# Patient Record
Sex: FEMALE | Race: WHITE | NOT HISPANIC OR LATINO | Employment: UNEMPLOYED | URBAN - METROPOLITAN AREA
[De-identification: names, ages, dates, MRNs, and addresses within clinical notes are randomized per-mention and may not be internally consistent; named-entity substitution may affect disease eponyms.]

---

## 2017-01-19 ENCOUNTER — CLINICAL SUPPORT (OUTPATIENT)
Dept: SMOKING CESSATION | Facility: CLINIC | Age: 37
End: 2017-01-19
Payer: COMMERCIAL

## 2017-01-19 DIAGNOSIS — F17.200 NICOTINE DEPENDENCE: Primary | ICD-10-CM

## 2017-01-19 PROCEDURE — 99407 BEHAV CHNG SMOKING > 10 MIN: CPT | Mod: S$GLB,,, | Performed by: GENERAL PRACTICE

## 2017-01-19 RX ORDER — DIPHENHYDRAMINE HCL 25 MG
4 CAPSULE ORAL
Qty: 190 EACH | Refills: 0 | Status: SHIPPED | OUTPATIENT
Start: 2017-01-19 | End: 2017-01-24 | Stop reason: RX

## 2017-01-24 DIAGNOSIS — F17.200 NICOTINE DEPENDENCE: Primary | ICD-10-CM

## 2017-01-24 RX ORDER — DIPHENHYDRAMINE HCL 25 MG
4 CAPSULE ORAL
Qty: 110 EACH | Refills: 0 | Status: SHIPPED | OUTPATIENT
Start: 2017-01-24

## 2017-06-07 ENCOUNTER — TELEPHONE (OUTPATIENT)
Dept: SMOKING CESSATION | Facility: CLINIC | Age: 37
End: 2017-06-07

## 2017-09-27 ENCOUNTER — CLINICAL SUPPORT (OUTPATIENT)
Dept: SMOKING CESSATION | Facility: CLINIC | Age: 37
End: 2017-09-27
Payer: COMMERCIAL

## 2017-09-27 DIAGNOSIS — F17.200 NICOTINE DEPENDENCE: Primary | ICD-10-CM

## 2017-09-27 PROCEDURE — 99407 BEHAV CHNG SMOKING > 10 MIN: CPT | Mod: S$GLB,,,

## 2017-12-21 ENCOUNTER — CLINICAL SUPPORT (OUTPATIENT)
Dept: SMOKING CESSATION | Facility: CLINIC | Age: 37
End: 2017-12-21
Payer: COMMERCIAL

## 2017-12-21 DIAGNOSIS — F17.200 NICOTINE DEPENDENCE: Primary | ICD-10-CM

## 2017-12-21 PROCEDURE — 99407 BEHAV CHNG SMOKING > 10 MIN: CPT | Mod: S$GLB,,, | Performed by: INTERNAL MEDICINE

## 2018-01-08 ENCOUNTER — TELEPHONE (OUTPATIENT)
Dept: SMOKING CESSATION | Facility: CLINIC | Age: 38
End: 2018-01-08

## 2018-01-08 NOTE — TELEPHONE ENCOUNTER
Left message about missed intake for the tobacco cessation program and asked if the patient would like to reschedule. Left my name Yoan Vergara with phone number [phone].

## 2023-03-08 ENCOUNTER — HOSPITAL ENCOUNTER (EMERGENCY)
Facility: HOSPITAL | Age: 43
Discharge: LEFT BEFORE DISCHARGE | End: 2023-03-09
Attending: EMERGENCY MEDICINE

## 2023-03-08 DIAGNOSIS — R10.9 ABDOMINAL PAIN: ICD-10-CM

## 2023-03-08 PROCEDURE — 99285 EMERGENCY DEPT VISIT HI MDM: CPT | Mod: 25

## 2023-03-08 PROCEDURE — 96361 HYDRATE IV INFUSION ADD-ON: CPT

## 2023-03-08 PROCEDURE — 96375 TX/PRO/DX INJ NEW DRUG ADDON: CPT

## 2023-03-08 PROCEDURE — 96365 THER/PROPH/DIAG IV INF INIT: CPT

## 2023-03-09 ENCOUNTER — HOSPITAL ENCOUNTER (OUTPATIENT)
Facility: HOSPITAL | Age: 43
Discharge: HOME | End: 2023-03-10
Attending: EMERGENCY MEDICINE | Admitting: INTERNAL MEDICINE

## 2023-03-09 VITALS
RESPIRATION RATE: 16 BRPM | WEIGHT: 175 LBS | BODY MASS INDEX: 29.16 KG/M2 | HEART RATE: 80 BPM | SYSTOLIC BLOOD PRESSURE: 116 MMHG | DIASTOLIC BLOOD PRESSURE: 70 MMHG | OXYGEN SATURATION: 99 % | TEMPERATURE: 98 F | HEIGHT: 65 IN

## 2023-03-09 DIAGNOSIS — R07.9 CHEST PAIN: ICD-10-CM

## 2023-03-09 DIAGNOSIS — K80.81 BILIARY CALCULUS OF OTHER SITE WITH OBSTRUCTION: Primary | ICD-10-CM

## 2023-03-09 LAB
ALBUMIN SERPL BCP-MCNC: 3.9 G/DL (ref 3.5–5.2)
ALP SERPL-CCNC: 77 U/L (ref 55–135)
ALT SERPL W/O P-5'-P-CCNC: 25 U/L (ref 10–44)
ANION GAP SERPL CALC-SCNC: 9 MMOL/L (ref 8–16)
AST SERPL-CCNC: 22 U/L (ref 10–40)
BASOPHILS # BLD AUTO: 0.01 K/UL (ref 0–0.2)
BASOPHILS NFR BLD: 0.1 % (ref 0–1.9)
BILIRUB SERPL-MCNC: 0.4 MG/DL (ref 0.1–1)
BILIRUB UR QL STRIP: NEGATIVE
BUN SERPL-MCNC: 7 MG/DL (ref 6–20)
CALCIUM SERPL-MCNC: 8.8 MG/DL (ref 8.7–10.5)
CHLORIDE SERPL-SCNC: 99 MMOL/L (ref 95–110)
CLARITY UR: CLEAR
CO2 SERPL-SCNC: 27 MMOL/L (ref 23–29)
COLOR UR: YELLOW
CREAT SERPL-MCNC: 0.7 MG/DL (ref 0.5–1.4)
DIFFERENTIAL METHOD: ABNORMAL
EOSINOPHIL # BLD AUTO: 0.1 K/UL (ref 0–0.5)
EOSINOPHIL NFR BLD: 1 % (ref 0–8)
ERYTHROCYTE [DISTWIDTH] IN BLOOD BY AUTOMATED COUNT: 14.6 % (ref 11.5–14.5)
EST. GFR  (NO RACE VARIABLE): >60 ML/MIN/1.73 M^2
GLUCOSE SERPL-MCNC: 129 MG/DL (ref 70–110)
GLUCOSE UR QL STRIP: NEGATIVE
HCT VFR BLD AUTO: 38.9 % (ref 37–48.5)
HGB BLD-MCNC: 12.6 G/DL (ref 12–16)
HGB UR QL STRIP: NEGATIVE
IMM GRANULOCYTES # BLD AUTO: 0.03 K/UL (ref 0–0.04)
IMM GRANULOCYTES NFR BLD AUTO: 0.4 % (ref 0–0.5)
KETONES UR QL STRIP: NEGATIVE
LEUKOCYTE ESTERASE UR QL STRIP: NEGATIVE
LIPASE SERPL-CCNC: 34 U/L (ref 4–60)
LYMPHOCYTES # BLD AUTO: 2.2 K/UL (ref 1–4.8)
LYMPHOCYTES NFR BLD: 31 % (ref 18–48)
MCH RBC QN AUTO: 26.6 PG (ref 27–31)
MCHC RBC AUTO-ENTMCNC: 32.4 G/DL (ref 32–36)
MCV RBC AUTO: 82 FL (ref 82–98)
MONOCYTES # BLD AUTO: 0.5 K/UL (ref 0.3–1)
MONOCYTES NFR BLD: 6.3 % (ref 4–15)
NEUTROPHILS # BLD AUTO: 4.4 K/UL (ref 1.8–7.7)
NEUTROPHILS NFR BLD: 61.2 % (ref 38–73)
NITRITE UR QL STRIP: NEGATIVE
NRBC BLD-RTO: 0 /100 WBC
PH UR STRIP: 8 [PH] (ref 5–8)
PLATELET # BLD AUTO: 280 K/UL (ref 150–450)
PMV BLD AUTO: 9.9 FL (ref 9.2–12.9)
POTASSIUM SERPL-SCNC: 3.6 MMOL/L (ref 3.5–5.1)
PROT SERPL-MCNC: 7.5 G/DL (ref 6–8.4)
PROT UR QL STRIP: NEGATIVE
RBC # BLD AUTO: 4.73 M/UL (ref 4–5.4)
SODIUM SERPL-SCNC: 135 MMOL/L (ref 136–145)
SP GR UR STRIP: 1.01 (ref 1–1.03)
TROPONIN I SERPL HS-MCNC: 2.8 PG/ML (ref 0–14.9)
URN SPEC COLLECT METH UR: NORMAL
UROBILINOGEN UR STRIP-ACNC: NEGATIVE EU/DL
WBC # BLD AUTO: 7.17 K/UL (ref 3.9–12.7)

## 2023-03-09 PROCEDURE — 25000003 PHARM REV CODE 250: Performed by: INTERNAL MEDICINE

## 2023-03-09 PROCEDURE — 96375 TX/PRO/DX INJ NEW DRUG ADDON: CPT

## 2023-03-09 PROCEDURE — 80053 COMPREHEN METABOLIC PANEL: CPT | Performed by: NURSE PRACTITIONER

## 2023-03-09 PROCEDURE — 84484 ASSAY OF TROPONIN QUANT: CPT | Performed by: STUDENT IN AN ORGANIZED HEALTH CARE EDUCATION/TRAINING PROGRAM

## 2023-03-09 PROCEDURE — 99285 EMERGENCY DEPT VISIT HI MDM: CPT | Mod: 25

## 2023-03-09 PROCEDURE — 83690 ASSAY OF LIPASE: CPT | Performed by: NURSE PRACTITIONER

## 2023-03-09 PROCEDURE — 25000003 PHARM REV CODE 250: Performed by: EMERGENCY MEDICINE

## 2023-03-09 PROCEDURE — 63600175 PHARM REV CODE 636 W HCPCS: Performed by: STUDENT IN AN ORGANIZED HEALTH CARE EDUCATION/TRAINING PROGRAM

## 2023-03-09 PROCEDURE — 81003 URINALYSIS AUTO W/O SCOPE: CPT | Performed by: NURSE PRACTITIONER

## 2023-03-09 PROCEDURE — 93005 ELECTROCARDIOGRAM TRACING: CPT | Performed by: INTERNAL MEDICINE

## 2023-03-09 PROCEDURE — 96376 TX/PRO/DX INJ SAME DRUG ADON: CPT

## 2023-03-09 PROCEDURE — G0378 HOSPITAL OBSERVATION PER HR: HCPCS

## 2023-03-09 PROCEDURE — 25500020 PHARM REV CODE 255: Performed by: EMERGENCY MEDICINE

## 2023-03-09 PROCEDURE — 96372 THER/PROPH/DIAG INJ SC/IM: CPT | Mod: 59 | Performed by: INTERNAL MEDICINE

## 2023-03-09 PROCEDURE — 93010 ELECTROCARDIOGRAM REPORT: CPT | Mod: ,,, | Performed by: INTERNAL MEDICINE

## 2023-03-09 PROCEDURE — 96374 THER/PROPH/DIAG INJ IV PUSH: CPT

## 2023-03-09 PROCEDURE — 85025 COMPLETE CBC W/AUTO DIFF WBC: CPT | Performed by: NURSE PRACTITIONER

## 2023-03-09 PROCEDURE — 63600175 PHARM REV CODE 636 W HCPCS: Performed by: INTERNAL MEDICINE

## 2023-03-09 PROCEDURE — 63600175 PHARM REV CODE 636 W HCPCS: Performed by: EMERGENCY MEDICINE

## 2023-03-09 PROCEDURE — 96361 HYDRATE IV INFUSION ADD-ON: CPT

## 2023-03-09 PROCEDURE — 93010 EKG 12-LEAD: ICD-10-PCS | Mod: ,,, | Performed by: INTERNAL MEDICINE

## 2023-03-09 RX ORDER — LANOLIN ALCOHOL/MO/W.PET/CERES
800 CREAM (GRAM) TOPICAL
Status: DISCONTINUED | OUTPATIENT
Start: 2023-03-09 | End: 2023-03-10 | Stop reason: HOSPADM

## 2023-03-09 RX ORDER — HYDROMORPHONE HYDROCHLORIDE 1 MG/ML
1 INJECTION, SOLUTION INTRAMUSCULAR; INTRAVENOUS; SUBCUTANEOUS
Status: COMPLETED | OUTPATIENT
Start: 2023-03-09 | End: 2023-03-09

## 2023-03-09 RX ORDER — DIPHENHYDRAMINE HCL 25 MG
50 CAPSULE ORAL
Status: COMPLETED | OUTPATIENT
Start: 2023-03-09 | End: 2023-03-09

## 2023-03-09 RX ORDER — ONDANSETRON 2 MG/ML
4 INJECTION INTRAMUSCULAR; INTRAVENOUS
Status: COMPLETED | OUTPATIENT
Start: 2023-03-09 | End: 2023-03-09

## 2023-03-09 RX ORDER — HYDROMORPHONE HYDROCHLORIDE 1 MG/ML
1 INJECTION, SOLUTION INTRAMUSCULAR; INTRAVENOUS; SUBCUTANEOUS ONCE
Status: COMPLETED | OUTPATIENT
Start: 2023-03-09 | End: 2023-03-09

## 2023-03-09 RX ORDER — DICYCLOMINE HYDROCHLORIDE 20 MG/1
20 TABLET ORAL EVERY 6 HOURS
COMMUNITY
Start: 2023-03-02

## 2023-03-09 RX ORDER — GLUCAGON 1 MG
1 KIT INJECTION
Status: DISCONTINUED | OUTPATIENT
Start: 2023-03-09 | End: 2023-03-10 | Stop reason: HOSPADM

## 2023-03-09 RX ORDER — IBUPROFEN 200 MG
24 TABLET ORAL
Status: DISCONTINUED | OUTPATIENT
Start: 2023-03-09 | End: 2023-03-10 | Stop reason: HOSPADM

## 2023-03-09 RX ORDER — AMLODIPINE BESYLATE 5 MG/1
5 TABLET ORAL DAILY
COMMUNITY
Start: 2023-02-16

## 2023-03-09 RX ORDER — PREGABALIN 50 MG/1
50 CAPSULE ORAL 2 TIMES DAILY
COMMUNITY
Start: 2023-03-02

## 2023-03-09 RX ORDER — DROPERIDOL 2.5 MG/ML
1.25 INJECTION, SOLUTION INTRAMUSCULAR; INTRAVENOUS ONCE
Status: COMPLETED | OUTPATIENT
Start: 2023-03-09 | End: 2023-03-09

## 2023-03-09 RX ORDER — LORAZEPAM 2 MG/ML
1 INJECTION INTRAMUSCULAR ONCE
Status: COMPLETED | OUTPATIENT
Start: 2023-03-09 | End: 2023-03-09

## 2023-03-09 RX ORDER — QUETIAPINE FUMARATE 50 MG/1
50 TABLET, FILM COATED ORAL NIGHTLY
COMMUNITY
Start: 2023-02-16

## 2023-03-09 RX ORDER — IBUPROFEN 200 MG
16 TABLET ORAL
Status: DISCONTINUED | OUTPATIENT
Start: 2023-03-09 | End: 2023-03-10 | Stop reason: HOSPADM

## 2023-03-09 RX ORDER — DIPHENHYDRAMINE HCL 25 MG
50 CAPSULE ORAL EVERY 6 HOURS PRN
Status: DISCONTINUED | OUTPATIENT
Start: 2023-03-09 | End: 2023-03-10 | Stop reason: HOSPADM

## 2023-03-09 RX ORDER — SODIUM CHLORIDE 0.9 % (FLUSH) 0.9 %
10 SYRINGE (ML) INJECTION EVERY 12 HOURS PRN
Status: DISCONTINUED | OUTPATIENT
Start: 2023-03-09 | End: 2023-03-10 | Stop reason: HOSPADM

## 2023-03-09 RX ORDER — ENOXAPARIN SODIUM 100 MG/ML
40 INJECTION SUBCUTANEOUS EVERY 24 HOURS
Status: DISCONTINUED | OUTPATIENT
Start: 2023-03-09 | End: 2023-03-10 | Stop reason: HOSPADM

## 2023-03-09 RX ORDER — MORPHINE SULFATE 4 MG/ML
4 INJECTION, SOLUTION INTRAMUSCULAR; INTRAVENOUS
Status: COMPLETED | OUTPATIENT
Start: 2023-03-09 | End: 2023-03-09

## 2023-03-09 RX ORDER — OXYCODONE HYDROCHLORIDE 5 MG/1
5 TABLET ORAL EVERY 4 HOURS PRN
Status: DISCONTINUED | OUTPATIENT
Start: 2023-03-09 | End: 2023-03-10 | Stop reason: HOSPADM

## 2023-03-09 RX ORDER — CETIRIZINE HYDROCHLORIDE 10 MG/1
10 TABLET ORAL DAILY
COMMUNITY

## 2023-03-09 RX ORDER — NALOXONE HCL 0.4 MG/ML
0.02 VIAL (ML) INJECTION
Status: DISCONTINUED | OUTPATIENT
Start: 2023-03-09 | End: 2023-03-10 | Stop reason: HOSPADM

## 2023-03-09 RX ORDER — HYDROMORPHONE HYDROCHLORIDE 1 MG/ML
0.2 INJECTION, SOLUTION INTRAMUSCULAR; INTRAVENOUS; SUBCUTANEOUS EVERY 6 HOURS PRN
Status: DISCONTINUED | OUTPATIENT
Start: 2023-03-09 | End: 2023-03-10

## 2023-03-09 RX ORDER — ESTRADIOL 2 MG/1
2 TABLET ORAL EVERY MORNING
COMMUNITY
Start: 2023-02-16

## 2023-03-09 RX ORDER — LORAZEPAM 0.5 MG
1 TABLET ORAL DAILY PRN
COMMUNITY

## 2023-03-09 RX ORDER — SODIUM CHLORIDE 9 MG/ML
1000 INJECTION, SOLUTION INTRAVENOUS
Status: COMPLETED | OUTPATIENT
Start: 2023-03-09 | End: 2023-03-09

## 2023-03-09 RX ORDER — TIZANIDINE 4 MG/1
4 TABLET ORAL EVERY 6 HOURS PRN
COMMUNITY

## 2023-03-09 RX ORDER — HYOSCYAMINE SULFATE 0.12 MG/1
0.12 TABLET SUBLINGUAL EVERY 4 HOURS PRN
COMMUNITY
Start: 2022-11-11

## 2023-03-09 RX ORDER — CIPROFLOXACIN 500 MG/1
500 TABLET ORAL 2 TIMES DAILY
Qty: 20 TABLET | Refills: 0 | Status: SHIPPED | OUTPATIENT
Start: 2023-03-09 | End: 2023-03-19

## 2023-03-09 RX ORDER — DIPHENHYDRAMINE HYDROCHLORIDE 50 MG/ML
12.5 INJECTION INTRAMUSCULAR; INTRAVENOUS
Status: COMPLETED | OUTPATIENT
Start: 2023-03-09 | End: 2023-03-09

## 2023-03-09 RX ADMIN — LORAZEPAM 1 MG: 2 INJECTION INTRAMUSCULAR; INTRAVENOUS at 06:03

## 2023-03-09 RX ADMIN — IOHEXOL 100 ML: 350 INJECTION, SOLUTION INTRAVENOUS at 12:03

## 2023-03-09 RX ADMIN — DIPHENHYDRAMINE HYDROCHLORIDE 50 MG: 25 CAPSULE ORAL at 03:03

## 2023-03-09 RX ADMIN — DROPERIDOL 1.25 MG: 2.5 INJECTION, SOLUTION INTRAMUSCULAR; INTRAVENOUS at 02:03

## 2023-03-09 RX ADMIN — HYDROMORPHONE HYDROCHLORIDE 1 MG: 1 INJECTION, SOLUTION INTRAMUSCULAR; INTRAVENOUS; SUBCUTANEOUS at 12:03

## 2023-03-09 RX ADMIN — MORPHINE SULFATE 4 MG: 4 INJECTION, SOLUTION INTRAMUSCULAR; INTRAVENOUS at 12:03

## 2023-03-09 RX ADMIN — HYDROMORPHONE HYDROCHLORIDE 1 MG: 1 INJECTION, SOLUTION INTRAMUSCULAR; INTRAVENOUS; SUBCUTANEOUS at 01:03

## 2023-03-09 RX ADMIN — HYDROMORPHONE HYDROCHLORIDE 1 MG: 1 INJECTION, SOLUTION INTRAMUSCULAR; INTRAVENOUS; SUBCUTANEOUS at 08:03

## 2023-03-09 RX ADMIN — OXYCODONE HYDROCHLORIDE 5 MG: 5 TABLET ORAL at 06:03

## 2023-03-09 RX ADMIN — PIPERACILLIN AND TAZOBACTAM 4.5 G: 4; .5 INJECTION, POWDER, LYOPHILIZED, FOR SOLUTION INTRAVENOUS; PARENTERAL at 03:03

## 2023-03-09 RX ADMIN — DIPHENHYDRAMINE HYDROCHLORIDE 50 MG: 25 CAPSULE ORAL at 09:03

## 2023-03-09 RX ADMIN — ONDANSETRON 4 MG: 2 INJECTION INTRAMUSCULAR; INTRAVENOUS at 12:03

## 2023-03-09 RX ADMIN — SODIUM CHLORIDE 1000 ML: 0.9 INJECTION, SOLUTION INTRAVENOUS at 12:03

## 2023-03-09 RX ADMIN — ENOXAPARIN SODIUM 40 MG: 100 INJECTION SUBCUTANEOUS at 05:03

## 2023-03-09 RX ADMIN — HYDROMORPHONE HYDROCHLORIDE 0.2 MG: 0.5 INJECTION, SOLUTION INTRAMUSCULAR; INTRAVENOUS; SUBCUTANEOUS at 04:03

## 2023-03-09 RX ADMIN — DIPHENHYDRAMINE HYDROCHLORIDE 12.5 MG: 50 INJECTION INTRAMUSCULAR; INTRAVENOUS at 02:03

## 2023-03-09 RX ADMIN — SODIUM CHLORIDE, SODIUM LACTATE, POTASSIUM CHLORIDE, AND CALCIUM CHLORIDE 1000 ML: .6; .31; .03; .02 INJECTION, SOLUTION INTRAVENOUS at 02:03

## 2023-03-09 NOTE — ED NOTES
Sent a message to Dr. Antonio about pt having itching and requesting mediation. Awaiting response.

## 2023-03-09 NOTE — ED PROVIDER NOTES
Encounter Date: 3/9/2023       History     Chief Complaint   Patient presents with    Abdominal Pain     SEEN HERE THIS AM FOR THE SAME.      Patient presents complaining of abdominal pain and discomfort.  Patient was recently evaluated earlier today and diagnosed with dilated common bile duct.  Patient left against medical advice.  She returns complaining of pain.    Review of patient's allergies indicates:   Allergen Reactions    Compazine [prochlorperazine edisylate] Other (See Comments)     Insomnia, jittery    Toradol [ketorolac] Other (See Comments)     Insomnia, jittery       Past Medical History:   Diagnosis Date    General anesthetics causing adverse effect in therapeutic use     slow to wake up / post op shivering    Migraine     Panic attacks     PONV (postoperative nausea and vomiting)      Past Surgical History:   Procedure Laterality Date    DILATION AND CURETTAGE OF UTERUS      x 2    ECTOPIC PREGNANCY SURGERY  2012    x 2    PELVIC LAPAROSCOPY      TUBAL LIGATION       Family History   Problem Relation Age of Onset    Hypertension Unknown     Colon cancer Neg Hx     Stomach cancer Neg Hx     Esophageal cancer Neg Hx     Breast cancer Neg Hx     Ovarian cancer Neg Hx      Social History     Tobacco Use    Smoking status: Every Day     Packs/day: 1.00     Years: 30.00     Pack years: 30.00     Types: Cigarettes     Start date: 4/9/1980    Smokeless tobacco: Never    Tobacco comments:     instructed not to smoke after midnight prior to surgery   Substance Use Topics    Alcohol use: Yes     Comment: occassionally  No alcohol prior to surgery    Drug use: No     Review of Systems   All other systems reviewed and are negative.    Physical Exam     Initial Vitals [03/09/23 1159]   BP Pulse Resp Temp SpO2   139/83 89 20 98 °F (36.7 °C) 98 %      MAP       --         Physical Exam    Nursing note and vitals reviewed.  Constitutional: She appears distressed.   Pleasant, polite   HENT:   Head: Normocephalic and  atraumatic.   Eyes: EOM are normal.   Neck: Neck supple.   Normal range of motion.  Cardiovascular:  Intact distal pulses.           Pulmonary/Chest: No respiratory distress.   Abdominal: She exhibits no distension. There is abdominal tenderness. There is no rebound.   Musculoskeletal:      Cervical back: Normal range of motion and neck supple.     Neurological: She is alert and oriented to person, place, and time.   Skin: Skin is warm and dry. Capillary refill takes less than 2 seconds.   Psychiatric: She has a normal mood and affect. Her behavior is normal. Judgment and thought content normal.       ED Course   Procedures  Labs Reviewed - No data to display       Imaging Results    None          Medications   sodium chloride 0.9% flush 10 mL (has no administration in time range)   naloxone 0.4 mg/mL injection 0.02 mg (has no administration in time range)   glucose chewable tablet 16 g (has no administration in time range)   glucose chewable tablet 24 g (has no administration in time range)   glucagon (human recombinant) injection 1 mg (has no administration in time range)   dextrose 10% bolus 125 mL 125 mL (has no administration in time range)   dextrose 10% bolus 250 mL 250 mL (has no administration in time range)   enoxaparin injection 40 mg (has no administration in time range)   potassium bicarbonate disintegrating tablet 50 mEq (has no administration in time range)   potassium bicarbonate disintegrating tablet 60 mEq (has no administration in time range)   potassium bicarbonate disintegrating tablet 35 mEq (has no administration in time range)   magnesium oxide tablet 800 mg (has no administration in time range)   magnesium oxide tablet 800 mg (has no administration in time range)   HYDROmorphone injection 0.2 mg (has no administration in time range)   LORazepam injection 1 mg (has no administration in time range)   HYDROmorphone injection 1 mg (1 mg Intravenous Given During Downtime 3/9/23 1215)   0.9%  NaCl  infusion (0 mLs Intravenous Stopped 3/9/23 1255)   ondansetron injection 4 mg (4 mg Intravenous Given During Downtime 3/9/23 1215)   HYDROmorphone injection 1 mg (1 mg Intravenous Given 3/9/23 1335)   diphenhydrAMINE injection 12.5 mg (12.5 mg Intravenous Given 3/9/23 1419)   diphenhydrAMINE capsule 50 mg (50 mg Oral Given 3/9/23 1541)     Medical Decision Making:   Initial Assessment:   Patient appears in pain  Differential Diagnosis:   Patient has a dilated common bile duct of uncertain etiology  ED Management:  MDM    Patient presents for emergent evaluation of acute abdominal pain that poses a possible threat to life and/or bodily function.    In the ED patient found to have acute dilated common bile duct.   I personally reviewed labs from previous.  Labs significant for normal liver function.      I reviewed it and reviewed the radiologist interpretation.  CT significant for dilated common bile duct.      Admission MDM  I discussed the patient presentation labs, ekg, X-rays, CT findings with the consultant(s) hospitalist   Patient was managed in the ED with IV Dilaudid.    The response to treatment was improved.    Patient required emergent consultation to hospitalist for admission.  Patient will need further evaluation for dilated common bile duct included MRCP and GI evaluation.  I discussed this with the hospitalist.                        Clinical Impression:   Final diagnoses:  [K80.81] Biliary calculus of other site with obstruction (Primary)        ED Disposition Condition    Observation                 Kylre Jones MD  03/09/23 5830

## 2023-03-09 NOTE — ED PROVIDER NOTES
Encounter Date: 3/8/2023       History     Chief Complaint   Patient presents with    Abdominal Pain     Across upper abdomen to back since about 7 PM     HPI    Latrice Cruz is a 42 y.o. female with a past medical history of IBD, fibromyalgia, migraines, and recurrent abdominal pain that presents emergency department for evaluation of severe sudden-onset upper abdominal pain that started approximately 7:00 p.m. today.  Patient was in her usual state of health when she began to have severe abdominal pain.  This radiates across her entire upper abdomen.  Endorses constipation.  Has not tried to eat since onset of pain.  She is experienced this pain in the past and has had multiple large workups without finding any etiology.  Denies shortness of breath, fever, chest pain, urinary symptoms, and URI symptoms.    Review of patient's allergies indicates:   Allergen Reactions    Compazine [prochlorperazine edisylate] Other (See Comments)     Insomnia, jittery    Toradol [ketorolac] Other (See Comments)     Insomnia, jittery       Past Medical History:   Diagnosis Date    General anesthetics causing adverse effect in therapeutic use     slow to wake up / post op shivering    Migraine     Panic attacks     PONV (postoperative nausea and vomiting)      Past Surgical History:   Procedure Laterality Date    DILATION AND CURETTAGE OF UTERUS      x 2    ECTOPIC PREGNANCY SURGERY  2012    x 2    PELVIC LAPAROSCOPY      TUBAL LIGATION       Family History   Problem Relation Age of Onset    Hypertension Unknown     Colon cancer Neg Hx     Stomach cancer Neg Hx     Esophageal cancer Neg Hx     Breast cancer Neg Hx     Ovarian cancer Neg Hx      Social History     Tobacco Use    Smoking status: Every Day     Packs/day: 1.00     Years: 30.00     Pack years: 30.00     Types: Cigarettes     Start date: 4/9/1980    Smokeless tobacco: Never    Tobacco comments:     instructed not to smoke after midnight prior to surgery   Substance Use  Topics    Alcohol use: Yes     Comment: occassionally  No alcohol prior to surgery    Drug use: No     Review of Systems   Constitutional:  Negative for fever.   HENT:  Negative for sore throat.    Respiratory:  Negative for shortness of breath.    Cardiovascular:  Negative for chest pain.   Gastrointestinal:  Positive for abdominal pain, constipation, nausea and vomiting. Negative for diarrhea.   Genitourinary:  Negative for dysuria.   Musculoskeletal:  Negative for back pain.   Skin:  Negative for rash.   Neurological:  Negative for weakness.   Hematological:  Does not bruise/bleed easily.     Physical Exam     Initial Vitals [03/08/23 2344]   BP Pulse Resp Temp SpO2   129/73 81 (!) 26 97.6 °F (36.4 °C) 98 %      MAP       --         Physical Exam    Nursing note and vitals reviewed.  Constitutional: She appears well-developed and well-nourished.   HENT:   Head: Normocephalic and atraumatic.   Eyes: EOM are normal. Pupils are equal, round, and reactive to light.   Neck: Neck supple.   Normal range of motion.  Cardiovascular:  Normal rate and regular rhythm.           Pulmonary/Chest: Breath sounds normal. No respiratory distress. She has no wheezes. She has no rales. She exhibits no tenderness.   Abdominal: There is abdominal tenderness (Severe diffuse). There is guarding (Voluntary).   Musculoskeletal:         General: Normal range of motion.      Cervical back: Normal range of motion and neck supple.     Neurological: She is alert and oriented to person, place, and time.   Skin: Capillary refill takes less than 2 seconds.   Psychiatric: She has a normal mood and affect.       ED Course   Procedures  Labs Reviewed   CBC W/ AUTO DIFFERENTIAL - Abnormal; Notable for the following components:       Result Value    MCH 26.6 (*)     RDW 14.6 (*)     All other components within normal limits    Narrative:     For upper or mid abdominal pain.   COMPREHENSIVE METABOLIC PANEL - Abnormal; Notable for the following  components:    Sodium 135 (*)     Glucose 129 (*)     All other components within normal limits    Narrative:     For upper or mid abdominal pain.   URINALYSIS, REFLEX TO URINE CULTURE    Narrative:     In and Out Cath as needed it patient unable to void  Specimen Source->Urine   LIPASE    Narrative:     For upper or mid abdominal pain.   TROPONIN I HIGH SENSITIVITY        ECG Results              EKG 12-lead (In process)  Result time 03/09/23 05:12:37      In process by Interface, Lab In [facility] (03/09/23 05:12:37)                   Narrative:    Test Reason : R10.9,    Vent. Rate : 073 BPM     Atrial Rate : 073 BPM     P-R Int : 156 ms          QRS Dur : 078 ms      QT Int : 428 ms       P-R-T Axes : 030 047 056 degrees     QTc Int : 471 ms    Normal sinus rhythm  Normal ECG  When compared with ECG of 25-SEP-2015 10:07,  No significant change was found    Referred By: [email or web address]   SELF           Confirmed By:                                   Imaging Results              US Abdomen Limited (Final result)  Result time 03/09/23 02:53:28      Final result by Landon Holguin MD (03/09/23 02:53:28)                   Narrative:    EXAM DESCRIPTION: US ABDOMEN LIMITED    CLINICAL HISTORY: 42 years Female, r/o js, biliary duct dilitation    COMPARISON: None.    TECHNIQUE: Sonographic imaging of the right upper quadrant was performed.    FINDINGS:    Liver measures 15.7 cm longitudinally and demonstrates increased echogenicity suggesting fatty changes. Normal directional flow in the main portal vein is demonstrated.    A few echogenic shadowing stones in the gallbladder are demonstrated measuring up to 1.2 cm. No evidence of gallbladder wall thickening. Gallbladder appears normal in size. Common bile duct appears large measuring up to 8mm in width. There is a small echogenic focus in the common bile duct questionable for a nonshadowing stone.    Pancreas is not well-seen due to bowel gas.    Right kidney measures 10.1 cm  x 4.8 cm x 4.8 cm. No evidence of right-sided hydronephrosis or renal stones. Echogenicity appears normal.    IMPRESSION:    1. Cholelithiasis. No findings to suggest cholecystitis.  2. Common bile duct appears dilated measuring up to 8 mm in width. Small echogenic structure in the common bile duct which may represent a small gallstone/choledocholithiasis versus artifact. Follow-up MRCP can be obtained for further evaluation.  3. Hepatic steatosis.    Electronically signed by:  Landon Holguin MD  3/9/2023 2:53 AM CST Workstation: [id number]                                     CT Abdomen Pelvis With Contrast (Final result)  Result time 03/09/23 01:33:51      Final result by Landon Holguin MD (03/09/23 01:33:51)                   Narrative:    CT ABDOMEN PELVIS WITH IV CONTRAST    Exam date: March 9, 2023    Comparison: None    Indication: Abdominal pain    Technique:    Multiple helical axial images were obtained through the abdomen and pelvis using intravenous contrast. Coronal and sagittal reformatted images were obtained.    All CT scans at this facility use dose modulation, iterative reconstruction, and/or weight-based dosing when appropriate to reduce radiation dose to as low as reasonably achievable.    Findings:    Lung bases: [Appear unremarkable].    Liver: [There is low-attenuation suggesting fatty changes.]    Gallbladder/biliary: [Gallbladder appears unremarkable. Common bile duct is dilated measuring up to 1 cm in width.    Pancreas: [Unremarkable.  No evidence of ductal enlargement.]    Spleen: Appears unremarkable. No splenomegaly.    Adrenals: Unremarkable.    Kidneys and ureters: [No evidence of hydronephrosis.  Normal enhancement.] Possible 2 mm stone in the midpole of the left kidney (series 3, image 96).    Bladder: Unremarkable.    Pelvic organs: There is a 2 cm hypodense, cystic structure at the left vaginal introitus suggestive of Bartholin's gland cyst. Post hysterectomy changes  demonstrated.    Bowel: [No evidence of bowel obstruction. There is possible pneumatosis involving the cecum. Rectum appears mildly thickened with trace adjacent stranding.] Appendix appears unremarkable.    Vasculature: Minimal aortoiliac atherosclerosis noted.    Peritoneum: No free air. No significant free fluid.    Lymph nodes: Unremarkable.    Soft tissues: Unremarkable.    Bones: Unremarkable.    Impression:    1. Nonspecific dilatation of the common bile duct, underlying distal biliary obstructive process is not excluded. Follow-up ultrasound may be helpful for further evaluation as appropriate.  2. Mildly thickened appearance of the rectum which may be related to contraction or proctitis. Possible nonspecific pneumatosis involving the cecum.  3. Hepatic steatosis.  4. Left-sided Bartholin's gland cyst.    Electronically signed by:  Landon Holguin MD  3/9/2023 1:33 AM CST Workstation: [id number]                                     X-Ray Chest AP Portable (Final result)  Result time 03/09/23 02:52:01      Final result by Nawaf Coffman MD (03/09/23 02:52:01)                   Narrative:    XR CHEST 1 VIEW    COMPARISONS: None.    ADDITIONAL PERTINENT HISTORY: Upper abdominal pain    FINDINGS:  Cardiomediastinal silhouette:  Negative.    Pulmonary vasculature:  Negative.    Lung fields:  Negative.    Pleural spaces: Negative.    Osseous structures:  Negative.    Surrounding soft tissues:  Negative.      IMPRESSION: No acute cardiopulmonary disease.    Electronically signed by:  Nawaf Coffman MD  3/9/2023 2:52 AM CST Workstation: [id number]                                     Medications   morphine injection 4 mg (4 mg Intravenous Given 3/9/23 0027)   ondansetron injection 4 mg (4 mg Intravenous Given 3/9/23 0027)   iohexoL (OMNIPAQUE 350) injection 100 mL (100 mLs Intravenous Given 3/9/23 0059)   droperidoL injection 1.25 mg (1.25 mg Intravenous Given 3/9/23 0201)   lactated ringers bolus 1,000 mL (0 mLs  Intravenous Stopped 3/9/23 6399)   piperacillin-tazobactam 4.5 g in dextrose 5 % 100 mL IVPB (ready to mix system) (0 g Intravenous Stopped 3/9/23 0056)     Medical Decision Making:   Initial Assessment:   Latrice Cruz is a 42 y.o. female with a past medical history of IBD, fibromyalgia, migraines, and recurrent abdominal pain that presents emergency department for evaluation of severe sudden-onset upper abdominal pain that started approximately 7:00 p.m. today.  Vitals are stable.  Exam notable for uncomfortable appearing female.  Lungs clear to auscultation bilaterally.  Heart sounds within normal limits.  Abdominal exam with diffuse tenderness with voluntary guarding throughout.  No rashes.  No lower extremity edema.  Differential Diagnosis:   Pancreatitis, gastritis, cholecystitis, choledocholithiasis, appendicitis, colitis, obstruction, and perforation.  Clinical Tests:   Lab Tests: Ordered and Reviewed  The following lab test(s) were unremarkable: CBC, CMP, Lipase and Troponin       <> Summary of Lab: Lipase was negative.  Troponin was negative.  Electrolytes were grossly normal.  CBC with no leukocytosis and normal hemoglobin.  Patient has hysterectomy, so not pregnant.  Radiological Study: Ordered and Reviewed  Medical Tests: Ordered and Reviewed  ED Management:  CT of the abdomen most concerning for possible common bile duct dilatation with possible proctitis.  There is also the possibility of pneumatosis in the cecum.  Given fluids and Zosyn.  Patient was given 1 L of fluids, Zofran, morphine, and droperidol near resolution of pain.  Given that she had the common bile duct dilatation, obtained follow-up ultrasound which did not definitively show choledocholithiasis.  They recommended getting an MRCP.  Discussed this with patient did not want to get admitted for an MRCP.  Discussed the risks of leaving including that her possible choledocholithiasis can evolve into cholangitis or cholecystitis which can  worsen into sepsis in the ultimately death.  Patient understood this and stated that she wished to leave so that she can care for her mother.  Return precautions given.  Prescribed ciprofloxacin for possible intra-abdominal infection.  Discharging home against medical advice.    Manjit Rodriguez  [facility] PGY3  03/09/2023 5:42 AM        Attending Note:  I provided a face to face evaluation of this patient.  I discussed the patient's care with the Resident.  I reviewed their note and agree with the history, physical, assessment, diagnosis, treatment, all procedures performed, xray and EKG interpretations and admit plan provided by the Resident. My overall impression is cholelithiasis with choledocholithiasis and common bile duct dilation, hepatic steatosis, severe right upper quadrant abdominal pain Bartholin gland cyst on left, possible proctitis, severe upper abdominal pain.  Patient came in with severe abdominal pain with peritoneal signs including severe tenderness throughout the abdomen voluntary guarding and rebound to right upper quadrant.  Pain was only controlled after both morphine and droperidol were provided.  Patient also received 4 mg of Zofran for nausea.  She received 1 L lactated Ringer's, and 4.5 g of Zosyn when she was found to have the choledocholithiasis in preparation to admit the patient to Hospital Medicine for MRCP this morning and GI consult but patient decided to leave AMA she understands her risks including choledocholithiasis resulting in cholangitis and cholecystitis possibly developing pancreatitis hepatitis sepsis intra-abdominal abscess or intra-abdominal infection.  We will discharge home with Cipro understanding that this is not adequate treatment and patient should ultimately be admitted and evaluated by GI.    Date: 3/9/2023  Patient: Latrice HARRIS Nancy  Admitted: 3/8/2023 11:43 PM  Attending Provider: MD Latrice Xiao or her authorized caregiver has made  "the decision for the patient to leave the emergency department against the advice of the emergency department staff. She or her authorized caregiver has been informed and understands the inherent risks, including death.  She or her authorized caregiver has decided to accept the responsibility for this decision. Latrice Cruz and all necessary parties have been advised that she may return for further evaluation or treatment. Her condition at time of discharge was Stable.  Latrice Cruz had current vital signs as follows:  /67   Pulse 72   Temp 97.6 °F (36.4 °C) (Oral)   Resp 16   Ht 5' 5" (1.651 m)   Wt 79.4 kg (175 lb)   LMP 09/02/2015 (Exact Date)     Rand Pope M.D. 3/9/2023 5:00 AM                 ED Course as of 03/09/23 0524   u Mar 09, 2023   0150 US Abdomen Limited [DC]      ED Course User Index  [DC] Manjit Rodriguez MD                 Clinical Impression:   Final diagnoses:  [R10.9] Abdominal pain        ED Disposition Condition    AMA Stable                Manjit Rodriguez MD  Resident  03/09/23 0542    "

## 2023-03-09 NOTE — H&P
[facility] Medicine History & Physical Examination   Patient Name: Latrice Cruz  MRN: [id number]  Patient Class: OP- Observation   Admission Date: 3/9/2023 11:47 AM  Length of Stay: 0  Attending Physician: Juarez Martin MD  Primary Care Provider: Digna Cruz MD  Face-to-Face encounter date: 03/09/2023  Code Status: Full Code  MPOA:  Chief Complaint: Abdominal Pain (SEEN HERE THIS AM FOR THE SAME. )        HISTORY OF PRESENT ILLNESS:   This is a 42-year-old female patient with past medical history of fibromyalgia, migraines, IBD, recurrent abdominal pain who presented to the emergency room last night with severe onset epigastric pain that started last night.  Patient says she is had similar pains multiple times in the past and has had multiple workup, imaging studies which have shown nothing.  She says the pain usually comes on suddenly is not associated with eating or any other activities.  She denies any episodes of nausea, vomiting, diarrhea, headache, fever, chest pain shortness a breath.  In the emergency room she had right upper quadrant ultrasound, CT scan which showed possible CBD dilation and cholelithiasis.  Her labs were unremarkable.  She is had no fevers.    REVIEW OF SYSTEMS:   10 Point Review of System was performed and was found to be negative except for that mentioned already in the HPI above.     PAST MEDICAL HISTORY:     Past Medical History:   Diagnosis Date    General anesthetics causing adverse effect in therapeutic use     slow to wake up / post op shivering    Migraine     Panic attacks     PONV (postoperative nausea and vomiting)        PAST SURGICAL HISTORY:     Past Surgical History:   Procedure Laterality Date    DILATION AND CURETTAGE OF UTERUS      x 2    ECTOPIC PREGNANCY SURGERY  2012    x 2    PELVIC LAPAROSCOPY      TUBAL LIGATION         ALLERGIES:   Compazine [prochlorperazine edisylate] and Toradol [ketorolac]    FAMILY HISTORY:     Family History    Problem Relation Age of Onset    Hypertension Unknown     Colon cancer Neg Hx     Stomach cancer Neg Hx     Esophageal cancer Neg Hx     Breast cancer Neg Hx     Ovarian cancer Neg Hx        SOCIAL HISTORY:     Social History     Tobacco Use    Smoking status: Every Day     Packs/day: 1.00     Years: 30.00     Pack years: 30.00     Types: Cigarettes     Start date: 4/9/1980    Smokeless tobacco: Never    Tobacco comments:     instructed not to smoke after midnight prior to surgery   Substance Use Topics    Alcohol use: Yes     Comment: occassionally  No alcohol prior to surgery        Social History     Substance and Sexual Activity   Sexual Activity Not on file        HOME MEDICATIONS:     Prior to Admission medications    Medication Sig Start Date End Date Taking? Authorizing Provider   alprazolam (XANAX) 0.25 MG tablet Take 1 tablet (0.25 mg total) by mouth 3 (three) times daily as needed for Anxiety (panic attack not controlled with xanax XR). 8/20/15   KINZA Caballero II, MD   amLODIPine (NORVASC) 5 MG tablet Take 5 mg by mouth once daily. 2/16/23   Historical Provider   aspirin-acetaminophen-caffeine 250-250-65 mg (EXCEDRIN MIGRAINE) 250-250-65 mg per tablet Take 1 tablet by mouth every 6 (six) hours as needed for Pain.    Historical Provider   butalbit/acetamin/caff/codeine (BUTALBITAL-ACETAMINOP-CAF-COD ORAL) butalbital-acetaminop-caf-cod Take No date recorded No form recorded No frequency recorded No route recorded No set duration recorded No set duration amount recorded active No dosage strength recorded No dosage strength units of measure recorded    Historical Provider   ciprofloxacin HCl (CIPRO) 500 MG tablet Take 1 tablet (500 mg total) by mouth 2 (two) times daily. for 10 days 3/9/23 3/19/23  Manjit Rodriguez MD   DESVENLAFAXINE SUCCINATE ORAL desvenlafaxine succinate Take No date recorded No form recorded No frequency recorded No route recorded No set duration recorded No set duration  "amount recorded active No dosage strength recorded No dosage strength units of measure recorded    Historical Provider   dicyclomine (BENTYL) 20 mg tablet Take 20 mg by mouth every 6 (six) hours. 3/2/23   Historical Provider   estradioL (ESTRACE) 2 MG tablet Take 2 mg by mouth every morning. 2/16/23   Historical Provider   hydrocodone-acetaminophen 5-325mg (NORCO) 5-325 mg per tablet Take 1 tablet by mouth every 6 (six) hours as needed for Pain. 9/28/15   Emigdio Bean MD   hyoscyamine (LEVSIN/SL) 0.125 mg Subl Take 0.125 mg by mouth every 4 (four) hours as needed. 11/11/22   Historical Provider   meperidine (DEMEROL) 50 mg tablet Take 0.5 tablets (25 mg total) by mouth every 4 (four) hours as needed for Pain. 10/5/15   Heather Luciano MD   naproxen sodium (ANAPROX) 220 MG tablet Take 220 mg by mouth every 12 (twelve) hours.    Historical Provider   naratriptan (AMERGE) 2.5 MG tablet Take 1 tab at onset of migraine.  May repeat 1 tab in 2 hours.  Limit to 3 tabs/week.  Patient taking differently: Take 2.5 mg by mouth as needed. Take 1 tab at onset of migraine.  May repeat 1 tab in 2 hours.  Limit to 3 tabs/week. 5/1/15 5/31/15  Wilberto Vasquez MD   nicotine (NICODERM CQ) 14 mg/24 hr Place 1 patch onto the skin once daily. 12/9/16   Cynthia Brannon MD   nicotine polacrilex (NICORETTE) 4 MG Gum Take 1 each (4 mg total) by mouth as needed (use no more than 4 pieces in 24 hours). 1/24/17   Cynthia Brannon MD   pregabalin (LYRICA) 50 MG capsule Take 50 mg by mouth 2 (two) times daily. 3/2/23   Historical Provider   QUEtiapine (SEROQUEL) 50 MG tablet Take 50 mg by mouth every evening. 2/16/23   Historical Provider         PHYSICAL EXAM:   /82   Pulse 82   Temp 98 °F (36.7 °C) (Oral)   Resp 17   Ht 5' 5" (1.651 m)   Wt 79.4 kg (175 lb)   LMP 09/02/2015 (Exact Date)   SpO2 97%   BMI 29.12 kg/m²   Vitals Reviewed  Constitutional: She appears well-developed and well-nourished.  NAD.  HENT:   Head: " Normocephalic and atraumatic.   Eyes: EOM are normal. Pupils are equal, round, and reactive to light.   Neck: Neck supple.   Normal range of motion.  Cardiovascular:  Normal rate and regular rhythm.           Pulmonary/Chest: Breath sounds normal. No respiratory distress. She has no wheezes. She has no rales. She exhibits no tenderness.   Abdominal:  Epigastric abdominal tenderness  Musculoskeletal:         General: Normal range of motion.      Cervical back: Normal range of motion and neck supple.   EMERGENCY DEPARTMENT LABS AND IMAGING:     Labs Reviewed - No data to display    MRI MRCP Without Contrast    (Results Pending)       ASSESSMENT & PLAN:     42-year-old female patient with past medical history of fibromyalgia, IBS who presents with sudden-onset epigastric pain that started last night.  She is had multiple similar episodes in the past and workup have all been negative as per the patient.  Her labs and vitals are unremarkable.  Imaging showed dilated CBD and cholelithiasis.    Abdominal pain, dilated CBD   Fibromyalgia  IBS   -admit patient for MRCP.  Pain management.   ________________________________________________________________________________    ________________________________________________________________________________    INPATIENT LIST OF MEDICATIONS     Current Facility-Administered Medications:     dextrose 10% bolus 125 mL 125 mL, 12.5 g, Intravenous, PRN, Juarez Martin MD    dextrose 10% bolus 250 mL 250 mL, 25 g, Intravenous, PRN, Juarez Martin MD    enoxaparin injection 40 mg, 40 mg, Subcutaneous, Daily, Juarez Martin MD    glucagon (human recombinant) injection 1 mg, 1 mg, Intramuscular, PRN, Juarez Martin MD    glucose chewable tablet 16 g, 16 g, Oral, PRN, Juarez Martin MD    glucose chewable tablet 24 g, 24 g, Oral, PRN, Juarez Martin MD    HYDROmorphone injection 0.2 mg, 0.2 mg, Intravenous, Q6H PRN, Juarez Martin MD, 0.2 mg at 03/09/23 1623    LORazepam injection 1  mg, 1 mg, Intravenous, Once, Juarez Martin MD    magnesium oxide tablet 800 mg, 800 mg, Oral, PRN, Juarez Martin MD    magnesium oxide tablet 800 mg, 800 mg, Oral, PRN, Juarez Martin MD    naloxone 0.4 mg/mL injection 0.02 mg, 0.02 mg, Intravenous, PRN, Juarez Martin MD    potassium bicarbonate disintegrating tablet 35 mEq, 35 mEq, Oral, PRN, Juarez Martin MD    potassium bicarbonate disintegrating tablet 50 mEq, 50 mEq, Oral, PRN, Juarez Martin MD    potassium bicarbonate disintegrating tablet 60 mEq, 60 mEq, Oral, PRN, Juarez Martin MD    sodium chloride 0.9% flush 10 mL, 10 mL, Intravenous, Q12H PRN, Juarez Martin MD    Current Outpatient Medications:     alprazolam (XANAX) 0.25 MG tablet, Take 1 tablet (0.25 mg total) by mouth 3 (three) times daily as needed for Anxiety (panic attack not controlled with xanax XR)., Disp: 60 tablet, Rfl: 5    amLODIPine (NORVASC) 5 MG tablet, Take 5 mg by mouth once daily., Disp: , Rfl:     aspirin-acetaminophen-caffeine 250-250-65 mg (EXCEDRIN MIGRAINE) 250-250-65 mg per tablet, Take 1 tablet by mouth every 6 (six) hours as needed for Pain., Disp: , Rfl:     butalbit/acetamin/caff/codeine (BUTALBITAL-ACETAMINOP-CAF-COD ORAL), butalbital-acetaminop-caf-cod Take No date recorded No form recorded No frequency recorded No route recorded No set duration recorded No set duration amount recorded active No dosage strength recorded No dosage strength units of measure recorded, Disp: , Rfl:     ciprofloxacin HCl (CIPRO) 500 MG tablet, Take 1 tablet (500 mg total) by mouth 2 (two) times daily. for 10 days, Disp: 20 tablet, Rfl: 0    DESVENLAFAXINE SUCCINATE ORAL, desvenlafaxine succinate Take No date recorded No form recorded No frequency recorded No route recorded No set duration recorded No set duration amount recorded active No dosage strength recorded No dosage strength units of measure recorded, Disp: , Rfl:     dicyclomine (BENTYL) 20 mg tablet, Take 20 mg by mouth  every 6 (six) hours., Disp: , Rfl:     estradioL (ESTRACE) 2 MG tablet, Take 2 mg by mouth every morning., Disp: , Rfl:     hydrocodone-acetaminophen 5-325mg (NORCO) 5-325 mg per tablet, Take 1 tablet by mouth every 6 (six) hours as needed for Pain., Disp: 15 tablet, Rfl: 0    hyoscyamine (LEVSIN/SL) 0.125 mg Subl, Take 0.125 mg by mouth every 4 (four) hours as needed., Disp: , Rfl:     meperidine (DEMEROL) 50 mg tablet, Take 0.5 tablets (25 mg total) by mouth every 4 (four) hours as needed for Pain., Disp: 10 tablet, Rfl: 0    naproxen sodium (ANAPROX) 220 MG tablet, Take 220 mg by mouth every 12 (twelve) hours., Disp: , Rfl:     naratriptan (AMERGE) 2.5 MG tablet, Take 1 tab at onset of migraine.  May repeat 1 tab in 2 hours.  Limit to 3 tabs/week. (Patient taking differently: Take 2.5 mg by mouth as needed. Take 1 tab at onset of migraine.  May repeat 1 tab in 2 hours.  Limit to 3 tabs/week.), Disp: 9 tablet, Rfl: 6    nicotine (NICODERM CQ) 14 mg/24 hr, Place 1 patch onto the skin once daily., Disp: 28 patch, Rfl: 1    nicotine polacrilex (NICORETTE) 4 MG Gum, Take 1 each (4 mg total) by mouth as needed (use no more than 4 pieces in 24 hours)., Disp: 110 each, Rfl: 0    pregabalin (LYRICA) 50 MG capsule, Take 50 mg by mouth 2 (two) times daily., Disp: , Rfl:     QUEtiapine (SEROQUEL) 50 MG tablet, Take 50 mg by mouth every evening., Disp: , Rfl:       Scheduled Meds:   enoxaparin  40 mg Subcutaneous Daily    lorazepam  1 mg Intravenous Once     Continuous Infusions:  PRN Meds:.dextrose 10%, dextrose 10%, glucagon (human recombinant), glucose, glucose, HYDROmorphone, magnesium oxide, magnesium oxide, naloxone, potassium bicarbonate, potassium bicarbonate, potassium bicarbonate, sodium chloride 0.9%      Juarez Murrell  [facility] Hospitalist  03/09/2023

## 2023-03-10 VITALS
DIASTOLIC BLOOD PRESSURE: 87 MMHG | SYSTOLIC BLOOD PRESSURE: 134 MMHG | BODY MASS INDEX: 30.37 KG/M2 | HEIGHT: 65 IN | RESPIRATION RATE: 18 BRPM | TEMPERATURE: 98 F | WEIGHT: 182.31 LBS | HEART RATE: 87 BPM | OXYGEN SATURATION: 98 %

## 2023-03-10 PROBLEM — R10.9 ABDOMINAL PAIN: Status: ACTIVE | Noted: 2023-03-10

## 2023-03-10 PROCEDURE — 25000003 PHARM REV CODE 250: Performed by: INTERNAL MEDICINE

## 2023-03-10 PROCEDURE — 63600175 PHARM REV CODE 636 W HCPCS: Performed by: INTERNAL MEDICINE

## 2023-03-10 PROCEDURE — G0378 HOSPITAL OBSERVATION PER HR: HCPCS

## 2023-03-10 PROCEDURE — 96376 TX/PRO/DX INJ SAME DRUG ADON: CPT

## 2023-03-10 RX ORDER — ONDANSETRON 4 MG/1
4 TABLET, FILM COATED ORAL EVERY 6 HOURS PRN
Qty: 15 TABLET | Refills: 0 | Status: SHIPPED | OUTPATIENT
Start: 2023-03-10

## 2023-03-10 RX ORDER — KETOROLAC TROMETHAMINE 10 MG/1
10 TABLET, FILM COATED ORAL EVERY 6 HOURS PRN
Qty: 10 TABLET | Refills: 0 | Status: SHIPPED | OUTPATIENT
Start: 2023-03-10 | End: 2023-03-10 | Stop reason: HOSPADM

## 2023-03-10 RX ORDER — HYDROMORPHONE HYDROCHLORIDE 1 MG/ML
0.2 INJECTION, SOLUTION INTRAMUSCULAR; INTRAVENOUS; SUBCUTANEOUS EVERY 6 HOURS PRN
Status: DISCONTINUED | OUTPATIENT
Start: 2023-03-10 | End: 2023-03-10 | Stop reason: HOSPADM

## 2023-03-10 RX ADMIN — HYDROMORPHONE HYDROCHLORIDE 0.2 MG: 1 INJECTION, SOLUTION INTRAMUSCULAR; INTRAVENOUS; SUBCUTANEOUS at 11:03

## 2023-03-10 RX ADMIN — OXYCODONE HYDROCHLORIDE 5 MG: 5 TABLET ORAL at 08:03

## 2023-03-10 NOTE — PLAN OF CARE
03/10/23 1159   Final Note   Assessment Type Final Discharge Note   Anticipated Discharge Disposition Home   What phone number can be called within the next 1-3 days to see how you are doing after discharge? [phone]   Post-Acute Status   Coverage self pay, however Tamia is assisting with Medicaid Application   Discharge Delays None known at this time       Tamia assisting patient with Medicaid Application.    Patient and Spouse notified CHAN of a concern with a Physician. CHAN provided patient with patient advocate number.    Discharge orders and chart reviewed with no further post-acute discharge needs identified at this time.  At this time, patient is cleared for discharge from Case Management standpoint.

## 2023-03-10 NOTE — PLAN OF CARE
[facility]  Initial Discharge Assessment       Primary Care Provider: Digna Cruz MD    Admission Diagnosis: Biliary calculus of other site with obstruction [K80.81]    Admission Date: 3/9/2023  Expected Discharge Date: 3/10/2023    Discharge Barriers Identified: None    Discharge assessment completed with patient at bedside. Information verified as correct on facesheet. Patient denies HD (dialysis) and coumadin. No home health. Patient reports independence in ADLs. Paul Rae (Spouse)   [phone]  to provide transport on discharge. Patient denies any discharge needs at this time. CM following.    Spouse and patient expressed concerns about a Physician not providing adequate care for patient. SW called Patient Advocate 2x to discuss concerns, however no answer. SW provided patient with patient advocate extension due to discharge orders.       Payor: /     Extended Emergency Contact Information  Primary Emergency Contact: Paul Rae  Mobile Phone: [phone]  Relation: Spouse  Preferred language: English   needed? No  Secondary Emergency Contact: Melany Bello   [place]  Mobile Phone: [phone]  Relation: Mother  Preferred language: English   needed? No    Discharge Plan A: Home, Home with family  Discharge Plan B: Home, Home with family      [id number] [place] - [address] [facility]  [address]  [place] [address]  Phone: [phone] Fax: [phone]    [id number] - [place], [place] - [address] [facility]  [address]  [place] [address]  Phone: [phone] Fax: [phone]      Initial Assessment (most recent)       Adult Discharge Assessment - 03/10/23 1156          Discharge Assessment    Assessment Type Discharge Planning Assessment     Confirmed/corrected address, phone number and insurance Yes     Confirmed Demographics Correct on Facesheet     Source of Information patient;health record     Does  patient/caregiver understand observation status Yes     Communicated JOHN with patient/caregiver Yes     Reason For Admission abdominal pain     People in Home spouse     Facility Arrived From: home     Do you expect to return to your current living situation? Yes     Do you have help at home or someone to help you manage your care at home? Yes     Who are your caregiver(s) and their phone number(s)? Paul Rae (Spouse)   [phone]     Prior to hospitilization cognitive status: Alert/Oriented     Current cognitive status: Alert/Oriented     Equipment Currently Used at Home none     Readmission within 30 days? No     Patient currently being followed by outpatient case management? No     Do you currently have service(s) that help you manage your care at home? No     Do you take prescription medications? Yes     Do you have prescription coverage? Yes     Coverage self pay however [place] assiting with Medicaid Application     Do you have any problems affording any of your prescribed medications? No     Is the patient taking medications as prescribed? yes     Who is going to help you get home at discharge? Paul Rae (Spouse)   [phone]     How do you get to doctors appointments? car, drives self;family or friend will provide     Are you on dialysis? No     Do you take coumadin? No     Discharge Plan A Home;Home with family     Discharge Plan B Home;Home with family     DME Needed Upon Discharge  none     Discharge Plan discussed with: Patient     Discharge Barriers Identified None

## 2023-03-10 NOTE — DISCHARGE SUMMARY
[facility]  Discharge Summary  Patient Name: Latrice Cruz MRN: [id number]   Patient Class: OP- Observation  Length of Stay: 0   Admission Date: 3/9/2023 11:47 AM Attending Physician: Juarez Martin MD   Primary Care Provider: Dgina Cruz MD Face-to-Face encounter date: 03/10/2023   Chief Complaint: Abdominal Pain (SEEN HERE THIS AM FOR THE SAME. )    Date of Discharge: 3/10/2023  Discharge Disposition:  Home or Self Care  Condition: Stable       Reason for Hospitalization   Abdominal pain      Patient Active Problem List   Diagnosis    Migraines    Chronic neck pain    Panic anxiety syndrome    Chronic depressive disorder    Tobacco use    OCD (obsessive compulsive disorder)    IBS (irritable bowel syndrome)    Sterilization    Abdominal pain       Brief History of Present Illness   This is a 42-year-old female patient with past medical history of fibromyalgia, migraines, IBD, recurrent abdominal pain who presented to the emergency room last night with severe onset epigastric pain that started last night.  Patient says she is had similar pains multiple times in the past and has had multiple workup, imaging studies which have shown nothing.  She says the pain usually comes on suddenly is not associated with eating or any other activities.  She denies any episodes of nausea, vomiting, diarrhea, headache, fever, chest pain shortness a breath.  In the emergency room she had right upper quadrant ultrasound, CT scan which showed possible CBD dilation and cholelithiasis.  Her labs were unremarkable.  She is had no fevers.    Hospital Course By Problem with Pertinent Findings     Patient presented with generalized epigastric pain that started suddenly.  She is had previously episodes like this before which have been worked up by multiple physicians with imaging studies and all have been negative.  Her labs were unremarkable.  Lipase was negative.  Vitals also unremarkable.  She did have ultrasound which  "showed mild dilation of the CBD of 8 mm in width.  She was admitted for an MRCP which showed no evidence of cholecystitis or choledocholithiasis.  Given she is had multiple episodes like this in the past and multiple negative workups I will leave pain likely related to her fibromyalgia and/or IBS.        Patient was seen and examined on the date of discharge and determined to be suitable for discharge.    Physical Exam  /72 (BP Location: Right arm, Patient Position: Lying)   Pulse 82   Temp 98.2 °F (36.8 °C) (Oral)   Resp 18   Ht 5' 5" (1.651 m)   Wt 82.7 kg (182 lb 5.1 oz)   LMP 09/02/2015 (Exact Date)   SpO2 96%   Breastfeeding No   BMI 30.34 kg/m²   Vitals reviewed.    Constitutional: No distress.   HENT: Atraumatic.   Pulmonary/Chest:  Nonlabored breathing, speaking full sentences, no use of accessory muscles  Abdominal: Soft.   Neurological: Alert.   Skin: Skin is warm and dry.     Following labs were Reviewed   No results for input(s): WBC, HGB, HCT, PLT, GLUCOSE, CALCIUM, ALBUMIN, PROT, NA, K, CO2, CL, BUN, CREATININE, ALKPHOS, ALT, AST, BILITOT in the last 24 hours.  No results found for: POCTGLUCOSE         Microbiology Results (last 7 days)       ** No results found for the last 168 hours. **          MRI MRCP Without Contrast   Final Result          No results found for this or any previous visit.      Consultants and Procedures   Consultants:          Discharge Information:   Diet:  Regular diet as tolerated    Physical Activity:  Activity as tolerated    Instructions:  1. Take all medications as prescribed  2. Keep all follow-up appointments      Follow-Up Appointments:  Please call your primary care physician to schedule an appointment in 1 week time.            Pending laboratory work/Tests to be performed/followed by the Primary care Physician:    The patient was discharged in the care of her parents//wife/family/caregiver, with discharge instructions were reviewed in written " and verbal form. All pertinent questions were discussed and prescriptions were provided. The importance of making follow up appointments and compliance of medications has been stressed repeatedly. The patient will follow up in 1 week or sooner as needed with the PCP, and the patient is on board with the plan. Upon discharge, patient needs to be on following medications.    Discharge Medications:     Medication List        START taking these medications      ondansetron 4 MG tablet  Commonly known as: ZOFRAN  Take 1 tablet (4 mg total) by mouth every 6 (six) hours as needed for Nausea.            CHANGE how you take these medications      naratriptan 2.5 MG tablet  Commonly known as: AMERGE  Take 1 tab at onset of migraine.  May repeat 1 tab in 2 hours.  Limit to 3 tabs/week.  What changed:   how much to take  how to take this  when to take this  reasons to take this            CONTINUE taking these medications      ALPRAZolam 0.25 MG tablet  Commonly known as: XANAX  Take 1 tablet (0.25 mg total) by mouth 3 (three) times daily as needed for Anxiety (panic attack not controlled with xanax XR).     amLODIPine 5 MG tablet  Commonly known as: NORVASC     aspirin-acetaminophen-caffeine 250-250-65 mg 250-250-65 mg per tablet  Commonly known as: EXCEDRIN MIGRAINE     BUTALBITAL-ACETAMINOP-CAF-COD ORAL     cetirizine 10 MG tablet  Commonly known as: ZYRTEC     DESVENLAFAXINE SUCCINATE ORAL     dicyclomine 20 mg tablet  Commonly known as: BENTYL     estradioL 2 MG tablet  Commonly known as: ESTRACE     HEARTBURN AND ACID REFLUX-ALOE 120-1,000-10 mg-unit-mg Tab  Generic drug: Ca cmb [id number]     HYDROcodone-acetaminophen 5-325 mg per tablet  Commonly known as: NORCO  Take 1 tablet by mouth every 6 (six) hours as needed for Pain.     hyoscyamine 0.125 mg Subl  Commonly known as: LEVSIN/SL     meperidine 50 mg tablet  Commonly known as: DEMEROL  Take 0.5 tablets (25 mg total) by mouth every 4 (four) hours as  needed for Pain.     naproxen sodium 220 MG tablet  Commonly known as: ANAPROX     nicotine 14 mg/24 hr  Commonly known as: NICODERM CQ  Place 1 patch onto the skin once daily.     nicotine polacrilex 4 MG Gum  Commonly known as: NICORETTE  Take 1 each (4 mg total) by mouth as needed (use no more than 4 pieces in 24 hours).     pregabalin 50 MG capsule  Commonly known as: LYRICA     QUEtiapine 50 MG tablet  Commonly known as: SEROQUEL     tiZANidine 4 MG tablet  Commonly known as: ZANAFLEX            ASK your doctor about these medications      ciprofloxacin HCl 500 MG tablet  Commonly known as: CIPRO  Take 1 tablet (500 mg total) by mouth 2 (two) times daily. for 10 days               Where to Get Your Medications        These medications were sent to [id number]  [place] - [address] [facility]  [address] [place] [address]      Phone: [phone]   ondansetron 4 MG tablet           I spent 45 minutes preparing the discharge including reviewing records from previous encounters, preparation of discharge summary, assessing and final examination of the patient, discharge medicine reconciliation, discussing plan of care, follow up and education and prescriptions.       Juarez Murrell  [facility] Hospitalist  03/10/2023